# Patient Record
Sex: MALE | Race: BLACK OR AFRICAN AMERICAN
[De-identification: names, ages, dates, MRNs, and addresses within clinical notes are randomized per-mention and may not be internally consistent; named-entity substitution may affect disease eponyms.]

---

## 2019-02-19 ENCOUNTER — HOSPITAL ENCOUNTER (OUTPATIENT)
Dept: HOSPITAL 82 - LAB | Age: 79
Discharge: HOME | End: 2019-02-19
Attending: INTERNAL MEDICINE
Payer: MEDICARE

## 2019-02-19 DIAGNOSIS — D86.0: ICD-10-CM

## 2019-02-19 DIAGNOSIS — I10: ICD-10-CM

## 2019-02-19 DIAGNOSIS — K27.3: ICD-10-CM

## 2019-02-19 DIAGNOSIS — Z79.891: ICD-10-CM

## 2019-02-19 DIAGNOSIS — E78.5: ICD-10-CM

## 2019-02-19 DIAGNOSIS — C61: Primary | ICD-10-CM

## 2019-02-19 LAB
ALBUMIN SERPL-MCNC: 4.5 G/DL (ref 3.2–5)
ALP SERPL-CCNC: 61 U/L (ref 38–126)
ANION GAP SERPL CALCULATED.3IONS-SCNC: 14 MMOL/L
AST SERPL-CCNC: 31 U/L (ref 19–48)
BASOPHILS NFR BLD AUTO: 1 % (ref 0–3)
BUN SERPL-MCNC: 16 MG/DL (ref 8–23)
BUN/CREAT SERPL: 12
CHLORIDE SERPL-SCNC: 102 MMOL/L (ref 95–108)
CHOLEST SERPL-MCNC: 235 MG/DL (ref 0–199)
CHOLEST/HDLC SERPL: 2.2 {RATIO}
CO2 SERPL-SCNC: 27 MMOL/L (ref 22–30)
CREAT SERPL-MCNC: 1.4 MG/DL (ref 0.7–1.3)
EOSINOPHIL NFR BLD AUTO: 3 % (ref 0–8)
ERYTHROCYTE [DISTWIDTH] IN BLOOD BY AUTOMATED COUNT: 12.6 % (ref 11.5–15.5)
HCT VFR BLD AUTO: 45.6 % (ref 39–50)
HDLC SERPL-MCNC: 107 MG/DL (ref 40–?)
HGB BLD-MCNC: 14.7 G/DL (ref 14–18)
IMM GRANULOCYTES NFR BLD: 0.3 % (ref 0–5)
LDLC SERPL CALC-MCNC: 114 MG/DL
LYMPHOCYTES NFR BLD: 58 % (ref 15–41)
MCH RBC QN AUTO: 29.9 PG  CALC (ref 26–32)
MCHC RBC AUTO-ENTMCNC: 32.2 G/L CALC (ref 32–36)
MCV RBC AUTO: 92.9 FL  CALC (ref 80–100)
MONOCYTES NFR BLD AUTO: 6 % (ref 2–13)
NEUTROPHILS # BLD AUTO: 1.24 THOU/UL (ref 1.82–7.42)
NEUTROPHILS NFR BLD AUTO: 31 % (ref 42–76)
PLATELET # BLD AUTO: 272 THOU/UL (ref 130–400)
POTASSIUM SERPL-SCNC: 4.5 MMOL/L (ref 3.5–5.1)
PROT SERPL-MCNC: 8.3 G/DL (ref 6.3–8.2)
RBC # BLD AUTO: 4.91 MILL/UL (ref 4.7–6.1)
SODIUM SERPL-SCNC: 139 MMOL/L (ref 137–146)
TRIGL SERPL-MCNC: 68 MG/DL (ref 30–149)
VLDLC SERPL CALC-MCNC: 14 MG/DL

## 2022-09-17 ENCOUNTER — HOSPITAL ENCOUNTER (EMERGENCY)
Dept: HOSPITAL 82 - ED | Age: 82
Discharge: HOME | End: 2022-09-17
Payer: MEDICARE

## 2022-09-17 VITALS — DIASTOLIC BLOOD PRESSURE: 75 MMHG | SYSTOLIC BLOOD PRESSURE: 163 MMHG

## 2022-09-17 VITALS — SYSTOLIC BLOOD PRESSURE: 171 MMHG | DIASTOLIC BLOOD PRESSURE: 80 MMHG

## 2022-09-17 VITALS — WEIGHT: 160.28 LBS | HEIGHT: 71 IN | BODY MASS INDEX: 22.44 KG/M2

## 2022-09-17 VITALS — DIASTOLIC BLOOD PRESSURE: 84 MMHG | SYSTOLIC BLOOD PRESSURE: 166 MMHG

## 2022-09-17 VITALS — DIASTOLIC BLOOD PRESSURE: 77 MMHG | SYSTOLIC BLOOD PRESSURE: 166 MMHG

## 2022-09-17 VITALS — DIASTOLIC BLOOD PRESSURE: 76 MMHG | SYSTOLIC BLOOD PRESSURE: 156 MMHG

## 2022-09-17 VITALS — SYSTOLIC BLOOD PRESSURE: 143 MMHG | DIASTOLIC BLOOD PRESSURE: 76 MMHG

## 2022-09-17 VITALS — DIASTOLIC BLOOD PRESSURE: 70 MMHG | SYSTOLIC BLOOD PRESSURE: 152 MMHG

## 2022-09-17 VITALS — SYSTOLIC BLOOD PRESSURE: 152 MMHG | DIASTOLIC BLOOD PRESSURE: 78 MMHG

## 2022-09-17 VITALS — SYSTOLIC BLOOD PRESSURE: 146 MMHG | DIASTOLIC BLOOD PRESSURE: 79 MMHG

## 2022-09-17 VITALS — SYSTOLIC BLOOD PRESSURE: 159 MMHG | DIASTOLIC BLOOD PRESSURE: 82 MMHG

## 2022-09-17 DIAGNOSIS — I10: ICD-10-CM

## 2022-09-17 DIAGNOSIS — K59.00: Primary | ICD-10-CM

## 2022-09-17 LAB
ALBUMIN SERPL-MCNC: 4.6 G/DL (ref 3.2–5)
ALP SERPL-CCNC: 63 U/L (ref 38–126)
ANION GAP SERPL CALCULATED.3IONS-SCNC: 12 MMOL/L
AST SERPL-CCNC: 28 U/L (ref 19–48)
BASOPHILS NFR BLD AUTO: 1 % (ref 0–3)
BUN SERPL-MCNC: 11 MG/DL (ref 8–23)
BUN/CREAT SERPL: 10
CHLORIDE SERPL-SCNC: 106 MMOL/L (ref 95–108)
CO2 SERPL-SCNC: 24 MMOL/L (ref 22–30)
CREAT SERPL-MCNC: 1.2 MG/DL (ref 0.7–1.3)
EOSINOPHIL NFR BLD AUTO: 3 % (ref 0–8)
ERYTHROCYTE [DISTWIDTH] IN BLOOD BY AUTOMATED COUNT: 12.8 % (ref 11.5–15.5)
HCT VFR BLD AUTO: 43.7 % (ref 39–50)
HGB BLD-MCNC: 14.6 G/DL (ref 14–18)
IMM GRANULOCYTES NFR BLD: 0 % (ref 0–5)
LIPASE SERPL-CCNC: 50 U/L (ref 23–300)
LYMPHOCYTES NFR BLD: 55 % (ref 15–41)
MCH RBC QN AUTO: 30.5 PG  CALC (ref 26–32)
MCHC RBC AUTO-ENTMCNC: 33.4 G/DL CAL (ref 32–36)
MCV RBC AUTO: 91.4 FL  CALC (ref 80–100)
MONOCYTES NFR BLD AUTO: 7 % (ref 2–13)
NEUTROPHILS # BLD AUTO: 1.47 THOU/UL (ref 1.82–7.42)
NEUTROPHILS NFR BLD AUTO: 33 % (ref 42–76)
PLATELET # BLD AUTO: 221 THOU/UL (ref 130–400)
POTASSIUM SERPL-SCNC: 4 MMOL/L (ref 3.5–5.1)
PROT SERPL-MCNC: 7.9 G/DL (ref 6.3–8.2)
RBC # BLD AUTO: 4.78 MILL/UL (ref 4.7–6.1)
SODIUM SERPL-SCNC: 138 MMOL/L (ref 137–146)

## 2022-12-30 ENCOUNTER — HOSPITAL ENCOUNTER (EMERGENCY)
Dept: HOSPITAL 82 - ED | Age: 82
Discharge: HOME | End: 2022-12-30
Payer: MEDICARE

## 2022-12-30 VITALS — SYSTOLIC BLOOD PRESSURE: 146 MMHG | DIASTOLIC BLOOD PRESSURE: 59 MMHG

## 2022-12-30 VITALS — HEIGHT: 71 IN | WEIGHT: 165.35 LBS | BODY MASS INDEX: 23.15 KG/M2

## 2022-12-30 VITALS — DIASTOLIC BLOOD PRESSURE: 58 MMHG | SYSTOLIC BLOOD PRESSURE: 145 MMHG

## 2022-12-30 VITALS — SYSTOLIC BLOOD PRESSURE: 145 MMHG | DIASTOLIC BLOOD PRESSURE: 58 MMHG

## 2022-12-30 VITALS — DIASTOLIC BLOOD PRESSURE: 59 MMHG | SYSTOLIC BLOOD PRESSURE: 142 MMHG

## 2022-12-30 DIAGNOSIS — I10: ICD-10-CM

## 2022-12-30 DIAGNOSIS — Z20.822: ICD-10-CM

## 2022-12-30 DIAGNOSIS — J06.9: Primary | ICD-10-CM

## 2023-03-08 ENCOUNTER — HOSPITAL ENCOUNTER (EMERGENCY)
Dept: HOSPITAL 82 - ED | Age: 83
Discharge: HOME | End: 2023-03-08
Payer: MEDICARE

## 2023-03-08 VITALS — BODY MASS INDEX: 23.58 KG/M2 | WEIGHT: 168.43 LBS | HEIGHT: 71 IN

## 2023-03-08 VITALS — DIASTOLIC BLOOD PRESSURE: 73 MMHG | SYSTOLIC BLOOD PRESSURE: 170 MMHG

## 2023-03-08 VITALS — DIASTOLIC BLOOD PRESSURE: 70 MMHG | SYSTOLIC BLOOD PRESSURE: 152 MMHG

## 2023-03-08 VITALS — DIASTOLIC BLOOD PRESSURE: 75 MMHG | SYSTOLIC BLOOD PRESSURE: 156 MMHG

## 2023-03-08 VITALS — SYSTOLIC BLOOD PRESSURE: 157 MMHG | DIASTOLIC BLOOD PRESSURE: 74 MMHG

## 2023-03-08 VITALS — SYSTOLIC BLOOD PRESSURE: 153 MMHG | DIASTOLIC BLOOD PRESSURE: 74 MMHG

## 2023-03-08 VITALS — SYSTOLIC BLOOD PRESSURE: 165 MMHG | DIASTOLIC BLOOD PRESSURE: 66 MMHG

## 2023-03-08 VITALS — DIASTOLIC BLOOD PRESSURE: 62 MMHG | SYSTOLIC BLOOD PRESSURE: 148 MMHG

## 2023-03-08 VITALS — DIASTOLIC BLOOD PRESSURE: 73 MMHG | SYSTOLIC BLOOD PRESSURE: 154 MMHG

## 2023-03-08 VITALS — SYSTOLIC BLOOD PRESSURE: 159 MMHG | DIASTOLIC BLOOD PRESSURE: 58 MMHG

## 2023-03-08 VITALS — SYSTOLIC BLOOD PRESSURE: 166 MMHG | DIASTOLIC BLOOD PRESSURE: 75 MMHG

## 2023-03-08 VITALS — SYSTOLIC BLOOD PRESSURE: 166 MMHG | DIASTOLIC BLOOD PRESSURE: 72 MMHG

## 2023-03-08 VITALS — SYSTOLIC BLOOD PRESSURE: 157 MMHG | DIASTOLIC BLOOD PRESSURE: 75 MMHG

## 2023-03-08 VITALS — DIASTOLIC BLOOD PRESSURE: 77 MMHG | SYSTOLIC BLOOD PRESSURE: 110 MMHG

## 2023-03-08 VITALS — SYSTOLIC BLOOD PRESSURE: 159 MMHG | DIASTOLIC BLOOD PRESSURE: 74 MMHG

## 2023-03-08 VITALS — SYSTOLIC BLOOD PRESSURE: 165 MMHG | DIASTOLIC BLOOD PRESSURE: 77 MMHG

## 2023-03-08 DIAGNOSIS — U07.1: Primary | ICD-10-CM

## 2023-03-08 DIAGNOSIS — Z85.46: ICD-10-CM

## 2023-03-08 DIAGNOSIS — R53.1: ICD-10-CM

## 2023-03-08 DIAGNOSIS — I10: ICD-10-CM

## 2023-03-08 LAB
ALBUMIN SERPL-MCNC: 4.1 G/DL (ref 3.2–5)
ALP SERPL-CCNC: 63 U/L (ref 38–126)
ANION GAP SERPL CALCULATED.3IONS-SCNC: 11 MMOL/L
AST SERPL-CCNC: 35 U/L (ref 19–48)
BASOPHILS NFR BLD AUTO: 0.7 % (ref 0–3)
BUN SERPL-MCNC: 12 MG/DL (ref 8–23)
BUN/CREAT SERPL: 11
CHLORIDE SERPL-SCNC: 106 MMOL/L (ref 95–108)
CO2 SERPL-SCNC: 25 MMOL/L (ref 22–30)
CREAT SERPL-MCNC: 1 MG/DL (ref 0.7–1.3)
EOSINOPHIL NFR BLD AUTO: 0.3 % (ref 0–8)
ERYTHROCYTE [DISTWIDTH] IN BLOOD BY AUTOMATED COUNT: 13.3 % (ref 11.5–15.5)
HCT VFR BLD AUTO: 41.4 % (ref 39–50)
HGB BLD-MCNC: 13.2 G/DL (ref 14–18)
IMM GRANULOCYTES NFR BLD: 0.5 % (ref 0–5)
LYMPHOCYTES NFR BLD: 19.4 % (ref 15–41)
MCH RBC QN AUTO: 29.9 PG  CALC (ref 26–32)
MCHC RBC AUTO-ENTMCNC: 31.9 G/DL CAL (ref 32–36)
MCV RBC AUTO: 93.7 FL  CALC (ref 80–100)
MONOCYTES NFR BLD AUTO: 8.5 % (ref 2–13)
NEUTROPHILS # BLD AUTO: 4.22 THOU/UL (ref 1.82–7.42)
NEUTROPHILS NFR BLD AUTO: 70.6 % (ref 42–76)
PLATELET # BLD AUTO: 224 THOU/UL (ref 130–400)
POTASSIUM SERPL-SCNC: 3.6 MMOL/L (ref 3.5–5.1)
PROT SERPL-MCNC: 7.6 G/DL (ref 6.3–8.2)
RBC # BLD AUTO: 4.42 MILL/UL (ref 4.7–6.1)
SODIUM SERPL-SCNC: 138 MMOL/L (ref 137–146)

## 2025-01-08 ENCOUNTER — HOSPITAL ENCOUNTER (EMERGENCY)
Dept: HOSPITAL 82 - ED | Age: 85
Discharge: HOME | DRG: 313 | End: 2025-01-08
Payer: COMMERCIAL

## 2025-01-08 VITALS — SYSTOLIC BLOOD PRESSURE: 170 MMHG | DIASTOLIC BLOOD PRESSURE: 84 MMHG

## 2025-01-08 VITALS — BODY MASS INDEX: 24.69 KG/M2 | WEIGHT: 176.37 LBS | HEIGHT: 71 IN

## 2025-01-08 DIAGNOSIS — R07.89: Primary | ICD-10-CM
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
## 2025-01-08 DIAGNOSIS — I10: ICD-10-CM

## 2025-01-08 DIAGNOSIS — S39.012A: ICD-10-CM

## 2025-01-08 LAB
ALBUMIN SERPL-MCNC: 4.4 G/DL (ref 3.2–5)
ALP SERPL-CCNC: 69 U/L (ref 38–126)
ANION GAP SERPL CALCULATED.3IONS-SCNC: 13 MMOL/L
AST SERPL-CCNC: 47 U/L (ref 19–48)
BASOPHILS NFR BLD AUTO: 0.4 % (ref 0–3)
BUN SERPL-MCNC: 17 MG/DL (ref 8–23)
BUN/CREAT SERPL: 13
CHLORIDE SERPL-SCNC: 108 MMOL/L (ref 95–108)
CO2 SERPL-SCNC: 20 MMOL/L (ref 22–30)
CREAT SERPL-MCNC: 1.3 MG/DL (ref 0.7–1.3)
EOSINOPHIL NFR BLD AUTO: 1 % (ref 0–8)
ERYTHROCYTE [DISTWIDTH] IN BLOOD BY AUTOMATED COUNT: 13 % (ref 11.5–15.5)
HCT VFR BLD AUTO: 48.1 % (ref 39–50)
HGB BLD-MCNC: 14.6 G/DL (ref 14–18)
IMM GRANULOCYTES NFR BLD: 1 % (ref 0–5)
LYMPHOCYTES NFR BLD: 18.6 % (ref 15–41)
MCH RBC QN AUTO: 30.5 PG  CALC (ref 26–32)
MCHC RBC AUTO-ENTMCNC: 30.4 G/DL CAL (ref 32–36)
MCV RBC AUTO: 100.6 FL  CALC (ref 80–100)
MONOCYTES NFR BLD AUTO: 5.1 % (ref 2–13)
NEUTROPHILS # BLD AUTO: 6.18 THOU/UL (ref 1.82–7.42)
NEUTROPHILS NFR BLD AUTO: 73.9 % (ref 42–76)
PLATELET # BLD AUTO: 203 THOU/UL (ref 130–400)
POTASSIUM SERPL-SCNC: 4.2 MMOL/L (ref 3.5–5.1)
PROT SERPL-MCNC: 8.4 G/DL (ref 6.3–8.2)
RBC # BLD AUTO: 4.78 MILL/UL (ref 4.7–6.1)
SODIUM SERPL-SCNC: 137 MMOL/L (ref 137–146)

## 2025-03-25 ENCOUNTER — HOSPITAL ENCOUNTER (OUTPATIENT)
Dept: HOSPITAL 82 - ED | Age: 85
Setting detail: OBSERVATION
LOS: 2 days | Discharge: HOME | End: 2025-03-27
Attending: INTERNAL MEDICINE | Admitting: INTERNAL MEDICINE
Payer: MEDICARE

## 2025-03-25 VITALS — DIASTOLIC BLOOD PRESSURE: 80 MMHG | SYSTOLIC BLOOD PRESSURE: 159 MMHG

## 2025-03-25 VITALS — DIASTOLIC BLOOD PRESSURE: 82 MMHG | SYSTOLIC BLOOD PRESSURE: 156 MMHG

## 2025-03-25 VITALS — DIASTOLIC BLOOD PRESSURE: 64 MMHG | SYSTOLIC BLOOD PRESSURE: 141 MMHG

## 2025-03-25 VITALS — DIASTOLIC BLOOD PRESSURE: 93 MMHG | SYSTOLIC BLOOD PRESSURE: 206 MMHG

## 2025-03-25 VITALS — DIASTOLIC BLOOD PRESSURE: 82 MMHG | SYSTOLIC BLOOD PRESSURE: 173 MMHG

## 2025-03-25 VITALS — DIASTOLIC BLOOD PRESSURE: 71 MMHG | SYSTOLIC BLOOD PRESSURE: 157 MMHG

## 2025-03-25 VITALS — SYSTOLIC BLOOD PRESSURE: 130 MMHG | DIASTOLIC BLOOD PRESSURE: 77 MMHG

## 2025-03-25 VITALS — SYSTOLIC BLOOD PRESSURE: 168 MMHG | DIASTOLIC BLOOD PRESSURE: 75 MMHG

## 2025-03-25 VITALS — SYSTOLIC BLOOD PRESSURE: 154 MMHG | DIASTOLIC BLOOD PRESSURE: 71 MMHG

## 2025-03-25 VITALS — DIASTOLIC BLOOD PRESSURE: 71 MMHG | SYSTOLIC BLOOD PRESSURE: 176 MMHG

## 2025-03-25 VITALS — DIASTOLIC BLOOD PRESSURE: 71 MMHG | SYSTOLIC BLOOD PRESSURE: 162 MMHG

## 2025-03-25 VITALS — HEIGHT: 71 IN | BODY MASS INDEX: 20.99 KG/M2 | WEIGHT: 149.91 LBS

## 2025-03-25 VITALS — SYSTOLIC BLOOD PRESSURE: 156 MMHG | DIASTOLIC BLOOD PRESSURE: 80 MMHG

## 2025-03-25 VITALS — DIASTOLIC BLOOD PRESSURE: 71 MMHG | SYSTOLIC BLOOD PRESSURE: 156 MMHG

## 2025-03-25 VITALS — DIASTOLIC BLOOD PRESSURE: 69 MMHG | SYSTOLIC BLOOD PRESSURE: 155 MMHG

## 2025-03-25 VITALS — SYSTOLIC BLOOD PRESSURE: 145 MMHG | DIASTOLIC BLOOD PRESSURE: 73 MMHG

## 2025-03-25 VITALS — DIASTOLIC BLOOD PRESSURE: 63 MMHG | SYSTOLIC BLOOD PRESSURE: 163 MMHG

## 2025-03-25 VITALS — SYSTOLIC BLOOD PRESSURE: 152 MMHG | DIASTOLIC BLOOD PRESSURE: 79 MMHG

## 2025-03-25 VITALS — SYSTOLIC BLOOD PRESSURE: 162 MMHG | DIASTOLIC BLOOD PRESSURE: 70 MMHG

## 2025-03-25 VITALS — SYSTOLIC BLOOD PRESSURE: 158 MMHG | DIASTOLIC BLOOD PRESSURE: 71 MMHG

## 2025-03-25 VITALS — SYSTOLIC BLOOD PRESSURE: 144 MMHG | DIASTOLIC BLOOD PRESSURE: 68 MMHG

## 2025-03-25 VITALS — SYSTOLIC BLOOD PRESSURE: 166 MMHG | DIASTOLIC BLOOD PRESSURE: 78 MMHG

## 2025-03-25 VITALS — DIASTOLIC BLOOD PRESSURE: 75 MMHG | SYSTOLIC BLOOD PRESSURE: 157 MMHG

## 2025-03-25 VITALS — SYSTOLIC BLOOD PRESSURE: 152 MMHG | DIASTOLIC BLOOD PRESSURE: 65 MMHG

## 2025-03-25 VITALS — DIASTOLIC BLOOD PRESSURE: 73 MMHG | SYSTOLIC BLOOD PRESSURE: 155 MMHG

## 2025-03-25 VITALS — DIASTOLIC BLOOD PRESSURE: 74 MMHG | SYSTOLIC BLOOD PRESSURE: 158 MMHG

## 2025-03-25 VITALS — DIASTOLIC BLOOD PRESSURE: 70 MMHG | SYSTOLIC BLOOD PRESSURE: 156 MMHG

## 2025-03-25 VITALS — DIASTOLIC BLOOD PRESSURE: 94 MMHG | SYSTOLIC BLOOD PRESSURE: 126 MMHG

## 2025-03-25 DIAGNOSIS — E78.5: ICD-10-CM

## 2025-03-25 DIAGNOSIS — R42: ICD-10-CM

## 2025-03-25 DIAGNOSIS — I10: ICD-10-CM

## 2025-03-25 DIAGNOSIS — Z85.46: ICD-10-CM

## 2025-03-25 DIAGNOSIS — Z92.3: ICD-10-CM

## 2025-03-25 DIAGNOSIS — N17.9: ICD-10-CM

## 2025-03-25 DIAGNOSIS — S32.049A: Primary | ICD-10-CM
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
## 2025-03-25 LAB
ALBUMIN SERPL-MCNC: 4.2 G/DL (ref 3.2–5)
ALP SERPL-CCNC: 86 U/L (ref 38–126)
ANION GAP SERPL CALCULATED.3IONS-SCNC: 10 MMOL/L
AST SERPL-CCNC: 41 U/L (ref 19–48)
BASOPHILS NFR BLD AUTO: 1.2 % (ref 0–3)
BUN SERPL-MCNC: 22 MG/DL (ref 8–23)
BUN/CREAT SERPL: 16
CHLORIDE SERPL-SCNC: 104 MMOL/L (ref 95–108)
CO2 SERPL-SCNC: 27 MMOL/L (ref 22–30)
CREAT SERPL-MCNC: 1.4 MG/DL (ref 0.7–1.3)
EOSINOPHIL NFR BLD AUTO: 5.1 % (ref 0–8)
ERYTHROCYTE [DISTWIDTH] IN BLOOD BY AUTOMATED COUNT: 13.3 % (ref 11.5–15.5)
HCT VFR BLD AUTO: 37.3 % (ref 39–50)
HGB BLD-MCNC: 12 G/DL (ref 14–18)
IMM GRANULOCYTES NFR BLD: 0 % (ref 0–5)
LYMPHOCYTES NFR BLD: 54.8 % (ref 15–41)
MCH RBC QN AUTO: 30 PG  CALC (ref 26–32)
MCHC RBC AUTO-ENTMCNC: 32.2 G/DL CAL (ref 32–36)
MCV RBC AUTO: 93.3 FL  CALC (ref 80–100)
MONOCYTES NFR BLD AUTO: 5.7 % (ref 2–13)
NEUTROPHILS # BLD AUTO: 1.87 THOU/UL (ref 1.82–7.42)
NEUTROPHILS NFR BLD AUTO: 33.2 % (ref 42–76)
PH UR STRIP.AUTO: 7 [PH] (ref 4.5–8)
PLATELET # BLD AUTO: 250 THOU/UL (ref 130–400)
POTASSIUM SERPL-SCNC: 4.3 MMOL/L (ref 3.5–5.1)
PROT SERPL-MCNC: 7.7 G/DL (ref 6.3–8.2)
RBC # BLD AUTO: 4 MILL/UL (ref 4.7–6.1)
SODIUM SERPL-SCNC: 137 MMOL/L (ref 137–146)
SP GR UR STRIP.AUTO: 1.01
UROBILINOGEN UR QL STRIP.AUTO: 0.2 E.U./DL

## 2025-03-25 PROCEDURE — A9579 GAD-BASE MR CONTRAST NOS,1ML: HCPCS

## 2025-03-25 PROCEDURE — G0378 HOSPITAL OBSERVATION PER HR: HCPCS

## 2025-03-25 NOTE — NUR
REPORT RECEIVED FROM IKE HERNANDEZ RN. PATIENT CARE COMPLETED, PATIENT AWAITING
BED ASSIGNMENT AND TRASNPORT TO MED SURG.

## 2025-03-25 NOTE — NUR
PT ARRIVED TO MED-SURG FLOOR APPROX AROUND 2010 VIA WHEELCHAIR ACCOMPANIED BY
NURSE. REPORT RECEIVED FROM NURSE PURDY. PT IS ALERT AND ORIENTED X4. DENIES
PIN AT THE MOMENT. PT STATES HE CAME IN DUE TO WEAKNESS TO HIS LEFT LEG
INCREASING DURING THE LAST WEEKS. RESPS ARE EVEN AND UNLABRED ON ROOM AIR.
TELE MONITOR #16 IN PLACE WITH LEADS INTACT. 20 G LAC FLUSHED WITH 5 CC ON
ARRIVAL.  BODY ASSESSMENT AND ADMISSION COMPLETED AT THIS TIME. SKIN IS
INTACT. ACTIVE BOWEL SOUNDS WITH ABDOMEN SOFT. STRONG PERIPHERAL PULSES TO
TOUCH. ORIENTED TO ROOM, CALL LIGHT AND SURROUNDINGS. PT.S WIFE WILL STAY
DURING THE NIGHT. CALL LIGHT IS WITHIN RECAH AND SAFETY PRECAUTIONS IN PLACE.

## 2025-03-25 NOTE — NUR
PT ARRIVED TO ME D-SURG FLOOR AROUND 2010 VIA WHEELCHAIR ACCOMPANIED BY NURSE.
REPORT RECEIVED FROM NURSE PURDY. PT IS ALERT AND ORIENTED X4. DENIES ANY
PAIN AT THE MOMENT. PT STATES HE CAME IN DUE TO WEAKNESS TO HER LEFT LEG
INCREASING DURING THE LAST WEEKS. RESPS EVEN AND UNLABORED ON ROOM AIR. TELE
MONITOR #16 IN PLACE WORKING PROPERLY. 20 G RAC FLUSHED WITH 5 CC. BODY
ASSESSMENT COMPLETED AT THIS TIME. SKIN IS INTACT. ACTIVE BOWEL SOUNDS WITH
SOFT ABDOMEN. WEAK PEDAL PULSES TO TOUCH. ORIENTED TO ROOM, CALL LIGHT AND
SURROUNDIGNS. PT'S WIFE WILL PASS THE NIGHT IN PT'S ROOM. CALL LIGHT IS IN
REACH AND SAFETY PRECAUTIONS IN PLACE.

## 2025-03-26 VITALS — DIASTOLIC BLOOD PRESSURE: 66 MMHG | SYSTOLIC BLOOD PRESSURE: 145 MMHG

## 2025-03-26 VITALS — SYSTOLIC BLOOD PRESSURE: 155 MMHG | DIASTOLIC BLOOD PRESSURE: 69 MMHG

## 2025-03-26 VITALS — SYSTOLIC BLOOD PRESSURE: 140 MMHG | DIASTOLIC BLOOD PRESSURE: 58 MMHG

## 2025-03-26 VITALS — SYSTOLIC BLOOD PRESSURE: 150 MMHG | DIASTOLIC BLOOD PRESSURE: 69 MMHG

## 2025-03-26 VITALS — SYSTOLIC BLOOD PRESSURE: 154 MMHG | DIASTOLIC BLOOD PRESSURE: 80 MMHG

## 2025-03-26 VITALS — DIASTOLIC BLOOD PRESSURE: 58 MMHG | SYSTOLIC BLOOD PRESSURE: 140 MMHG

## 2025-03-26 VITALS — SYSTOLIC BLOOD PRESSURE: 145 MMHG | DIASTOLIC BLOOD PRESSURE: 66 MMHG

## 2025-03-26 VITALS — DIASTOLIC BLOOD PRESSURE: 72 MMHG | SYSTOLIC BLOOD PRESSURE: 150 MMHG

## 2025-03-26 VITALS — SYSTOLIC BLOOD PRESSURE: 150 MMHG | DIASTOLIC BLOOD PRESSURE: 72 MMHG

## 2025-03-26 LAB
ALBUMIN SERPL-MCNC: 4.4 G/DL (ref 3.2–5)
ALP SERPL-CCNC: 91 U/L (ref 38–126)
ANION GAP SERPL CALCULATED.3IONS-SCNC: 12 MMOL/L
AST SERPL-CCNC: 48 U/L (ref 19–48)
BASOPHILS NFR BLD AUTO: 1.4 % (ref 0–3)
BUN SERPL-MCNC: 17 MG/DL (ref 8–23)
BUN/CREAT SERPL: 16
CHLORIDE SERPL-SCNC: 104 MMOL/L (ref 95–108)
CHOLEST SERPL-MCNC: 221 MG/DL (ref 0–199)
CHOLEST/HDLC SERPL: 2.3 {RATIO}
CO2 SERPL-SCNC: 26 MMOL/L (ref 22–30)
CREAT SERPL-MCNC: 1.1 MG/DL (ref 0.7–1.3)
EOSINOPHIL NFR BLD AUTO: 5.7 % (ref 0–8)
ERYTHROCYTE [DISTWIDTH] IN BLOOD BY AUTOMATED COUNT: 13.2 % (ref 11.5–15.5)
HCT VFR BLD AUTO: 40.4 % (ref 39–50)
HDLC SERPL-MCNC: 98 MG/DL (ref 39–59)
HGB BLD-MCNC: 13.1 G/DL (ref 14–18)
IMM GRANULOCYTES NFR BLD: 0.2 % (ref 0–5)
LDLC SERPL CALC-MCNC: 105 MG/DL
LYMPHOCYTES NFR BLD: 49.5 % (ref 15–41)
MAGNESIUM SERPL-MCNC: 1.9 MG/DL (ref 1.6–2.3)
MCH RBC QN AUTO: 30.4 PG  CALC (ref 26–32)
MCHC RBC AUTO-ENTMCNC: 32.4 G/DL CAL (ref 32–36)
MCV RBC AUTO: 93.7 FL  CALC (ref 80–100)
MONOCYTES NFR BLD AUTO: 6.4 % (ref 2–13)
NEUTROPHILS # BLD AUTO: 2.15 THOU/UL (ref 1.82–7.42)
NEUTROPHILS NFR BLD AUTO: 36.8 % (ref 42–76)
PLATELET # BLD AUTO: 272 THOU/UL (ref 130–400)
POTASSIUM SERPL-SCNC: 4.3 MMOL/L (ref 3.5–5.1)
PROT SERPL-MCNC: 7.9 G/DL (ref 6.3–8.2)
RBC # BLD AUTO: 4.31 MILL/UL (ref 4.7–6.1)
SODIUM SERPL-SCNC: 138 MMOL/L (ref 137–146)
TRIGL SERPL-MCNC: 91 MG/DL (ref 0–149)
VLDLC SERPL CALC-MCNC: 18 MG/DL

## 2025-03-26 NOTE — NUR
RECD PT FROM DAY SHIFT RN. PT DENIES PAIN SOB OR COMPLAINTS AT THIS TIME.
EDUCATED ON CALL LIGHT AND FALL PRECAUTIONS. DEMONSTRATES UNDERSTANDING.

## 2025-03-26 NOTE — NUR
PATIENT RESTING IN BED IN SUPINE POSITION. PT IS AWAKE AND DENIES ANY NEEDS AT
THE MOMENT. RESPS EVEN AND UNLABOED ON ROOM AIR. TELE MONITOR READING SR-[id number].
WIFE IS AT BEDSIDE. CALL LIGHT IS IN REACH AND SAFETY PRECAUTIONS IN PLACE.

## 2025-03-26 NOTE — NUR
PT IS FOUND IN BED RESTING COMFORTABLY. PT IS A&OX4; STABLE. NIH IS A 0. PT
CAN MOVE ALL EXTREMITES; SBA OOB WITH A CANE. PLAN OF CARE WAS REVIEWED; NO
FURTHER QUESTIONS AT THIS TIME. CALL LIGHT IS WITHIN REACH; BED ALARM IS ON.

## 2025-03-26 NOTE — NUR
PATIENT RESTING IN BED AND RESPONDS TO VERBAL STIMULI. DECLINES ANY NEEDS.
RESPS EVEN AND UNLABORED ON ROOM AIR. TELE MONITOR READING SR. CALL LIGHT IS
WITHIN REACH AND SAFETY PRECAUTIONS IN PLACE.

## 2025-03-27 VITALS — SYSTOLIC BLOOD PRESSURE: 144 MMHG | DIASTOLIC BLOOD PRESSURE: 80 MMHG

## 2025-03-27 VITALS — DIASTOLIC BLOOD PRESSURE: 69 MMHG | SYSTOLIC BLOOD PRESSURE: 131 MMHG

## 2025-03-27 VITALS — DIASTOLIC BLOOD PRESSURE: 69 MMHG | SYSTOLIC BLOOD PRESSURE: 154 MMHG

## 2025-03-27 VITALS — DIASTOLIC BLOOD PRESSURE: 81 MMHG | SYSTOLIC BLOOD PRESSURE: 147 MMHG

## 2025-03-27 LAB
ALBUMIN SERPL-MCNC: 3.9 G/DL (ref 3.2–5)
ALP SERPL-CCNC: 91 U/L (ref 38–126)
ANION GAP SERPL CALCULATED.3IONS-SCNC: 11 MMOL/L
AST SERPL-CCNC: 36 U/L (ref 19–48)
BUN SERPL-MCNC: 17 MG/DL (ref 8–23)
BUN/CREAT SERPL: 16
CHLORIDE SERPL-SCNC: 104 MMOL/L (ref 95–108)
CO2 SERPL-SCNC: 26 MMOL/L (ref 22–30)
CREAT SERPL-MCNC: 1 MG/DL (ref 0.7–1.3)
POTASSIUM SERPL-SCNC: 4.2 MMOL/L (ref 3.5–5.1)
PROT SERPL-MCNC: 7.1 G/DL (ref 6.3–8.2)
SODIUM SERPL-SCNC: 137 MMOL/L (ref 137–146)

## 2025-03-27 NOTE — NUR
ALL DISCHARGE INSTRUCTIONS PROVIDED AT THIS TIME. PT INSTRUCTED TO F/U WITH
PCP IN THE NEXT WEEK, RESUME HOME MEDICATIONS AND PARTICIPATE IN OUTPT
PHYSICAL THERAPY. PT DENIES ANY ADDITIONAL QUESTIONS OR NEEDS.IV SITE REMOVED
WITH CATHETER INTACT AND TELE MONITORING D/C. WC TO BE PROVIDED FOR D/C
HOME.FREQUENT ROUNDS MADE.

## 2025-03-27 NOTE — NUR
PT RESTING IN SEMI FOWLERS POSITION EATING LUNCH.RESPIRATIONS EVEN AND
UNLABORED ON RA. PT DENIES ANY CURRENT PAIN OR NEEDS.TELE MONITORING IN
PLACE.IV SITE PATENT.ENCOURAGED TO CALL FOR ASSISTANCE IF NEEDED.CALL LIGHT IN
REACH.FREQUENT ROUNDS MADE.

## 2025-03-27 NOTE — NUR
Discharge instructions given. Patient verbalizes understanding of same.
Discharged in stable condition via Wheelchair to Home with
family. All belongings sent with pt.
PT LEFT UNIT VIA WC ACCOMPANIED BY VOLUNTEER AND FAMILY. ALL PERSONAL
BELONGINGS LEFT WITH PT.FAMILY TO TRANSPORT PT HOME.

## 2025-03-27 NOTE — NUR
PT REMAINED STABLE THROUGHOUT SHIFT. NO SIGN OF DISTRESS NOTED, RESTING CALMLY
WITH CALL LIGHT WITHIN REACH

## 2025-03-27 NOTE — NUR
PT RESTING IN SEMI FOWLERS POSITION,A&O X3 WITH SPOUSE AT
BEDSIDE.PT DENIES ANY CURRENT PAIN OR DISCOMFORTS, PAIN SCALE AND
REPORTING EDUCATED.RESPIRATIONS EVEN AND UNLABORED ON RA, CLEAR LUNG SOUNDS.
ABDOMEN SOFT ON PALPATION AND ACTIVE IN ALL 4 QUADRANTS.STRONG PEDAL PULSES.
SKIN INTACT;TELE MONITORING 16 IN PLACE. #20G TO RAC FLUSHED AND PATENT,SITE
APPEARS HEALTHY.NIH O.PT DENIES ANY ADDITIONAL NEEDS AND IS ENCOURAGED TO CALL
FOR ASSISTANCE IF NEEDED.FALL PRECAUTIONS IN PLACE WITH BED IN THE LOWEST
POSITION AND CALL LIGHT IN REACH.FREQUENT ROUNDS MADE.